# Patient Record
(demographics unavailable — no encounter records)

---

## 2024-10-18 NOTE — HISTORY OF PRESENT ILLNESS
[FreeTextEntry1] : 64 y/o F with h/o DVT / PE in 2022, on lifelong anticoagulation, presents with symptoms of tingling in the hands and feet, was seen by Neurology and EMG testing showed carpal tunnel syndrome and LE testing was told to be normal. She was sent in as she has h/o DVT.

## 2024-10-18 NOTE — ASSESSMENT
[FreeTextEntry1] :  64 y/o F with h/o DVT / PE in 2022, on lifelong anticoagulation, presents with symptoms of tingling in the hands and feet, was seen by Neurology and EMG testing showed carpal tunnel syndrome and LE testing was told to be normal. She was sent in as she has h/o DVT.  Duplex today showed no evidence of DVT in the LE bilaterally.  She has palpable pulses in the LE. No vascular surgical intervention needed.  Follow up as needed.  I spent 35 minutes with patient performing physical exam, reviewing duplex results, discussing treatment plan and follow up.

## 2024-10-18 NOTE — DATA REVIEWED
[FreeTextEntry1] : I performed a venous duplex which was medically necessary to evaluate for DVT. It showed no evidence of DVT in the LE bilaterally.
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
## 2024-12-03 NOTE — ASSESSMENT
[FreeTextEntry1] : Patient is a 65 year old female with H/o Coomb's positive Warm autoimmune hemolytic anemia, responded previously well on Prednisone. Acute B/L peroneal Vein DVT and acute PE currently stable on AC.  # She had recurrent hemolysis. s/p Rituxan x 4 doses now resolved.  Plan: Previous notes reviewed and all relevant laboratory results discussed with  and were communicated to the patient.  -- CBC today is stable. Will continue to monitor. -- She will continue PO Vitamin B12 and Folic Acid.  #DVT/PE : likely provoked by acute hemolysis. Pt's Ct scan in 3/23 did show e/o Rt ventricular strain. ECHO was unremarkable other than mild pulm HTN.  Had a long discussion with pt about duration of AC. Since it seems to have been a provoked event, one could consider DCing AC. I suggested holding AC x 2 weeks and then checking D-Dimer. Based on those rslts could DC or restart AC. Options of low dose Eliquis 2.5mg bid or ASA were d/w pt.  She is concerned about risk of recurrent DVT and wants to continue for now. Will also see her cardio and could have ECHO repeated. -- Patient will continue on Eliquis 5 mg po every 12 hrs,  -- WOF signs of bleeding/bruising.  Will address this at her next visit again. RTC in 4 months with CBC.
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
## 2024-12-13 NOTE — BEGINNING OF VISIT
[0] : 2) Feeling down, depressed, or hopeless: Not at all (0) [PHQ-2 Negative] : PHQ-2 Negative [Pain Scale: ___] : On a scale of 1-10, today the patient's pain is a(n) [unfilled]. [Never] : Never [0-4] : 0-4 [Date Discussed (MM/DD/YY): ___] : Discussed: [unfilled] [Reviewed, no changes] : Reviewed, no changes [[id number]] : 0

## 2024-12-13 NOTE — BEGINNING OF VISIT
[0] : 2) Feeling down, depressed, or hopeless: Not at all (0) [PHQ-2 Negative] : PHQ-2 Negative [Pain Scale: ___] : On a scale of 1-10, today the patient's pain is a(n) [unfilled]. [Never] : Never [0-4] : 0-4 [Date Discussed (MM/DD/YY): ___] : Discussed: [unfilled] [Reviewed, no changes] : Reviewed, no changes [[id number]] : 0

## 2024-12-13 NOTE — BEGINNING OF VISIT
[0] : 2) Feeling down, depressed, or hopeless: Not at all (0) [PHQ-2 Negative] : PHQ-2 Negative [Pain Scale: ___] : On a scale of 1-10, today the patient's pain is a(n) [unfilled]. [Never] : Never [0-4] : 0-4 [Date Discussed (MM/DD/YY): ___] : Discussed: [unfilled] [Reviewed, no changes] : Reviewed, no changes [[id number]] : 0

## 2025-03-14 NOTE — ASSESSMENT
[FreeTextEntry1] : Patient is a 66 year old female with H/o Coomb's positive Warm autoimmune hemolytic anemia, responded previously well on Prednisone. Acute B/L peroneal Vein DVT and acute PE currently stable on AC.  # She had recurrent hemolysis. s/p Rituxan x 4 doses now resolved.  Plan: Previous notes reviewed and all relevant laboratory results discussed with and were communicated to the patient.  -- CBC remains stable. Will continue to monitor. -- She will continue PO Vitamin B12 and Folic Acid.  #DVT/PE : likely provoked by acute hemolysis. Pt's Ct scan in 3/23 did show e/o Rt ventricular strain. ECHO was unremarkable other than mild pulm HTN. PT HAS DISCONTINUED ELIQUIS after speaking to her cardio.  UTD with GI ( had H.Pylori recently) GYN and mammogram  Seeing a specialist at [facility] for low Ig levels ? CVID RTC in 6 months with CBC.
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
## 2025-03-14 NOTE — ASSESSMENT
[FreeTextEntry1] : Patient is a 66 year old female with H/o Coomb's positive Warm autoimmune hemolytic anemia, responded previously well on Prednisone. Acute B/L peroneal Vein DVT and acute PE currently stable on AC.  # She had recurrent hemolysis. s/p Rituxan x 4 doses now resolved.  Plan: Previous notes reviewed and all relevant laboratory results discussed with and were communicated to the patient.  -- CBC remains stable. Will continue to monitor. -- She will continue PO Vitamin B12 and Folic Acid.  #DVT/PE : likely provoked by acute hemolysis. Pt's Ct scan in 3/23 did show e/o Rt ventricular strain. ECHO was unremarkable other than mild pulm HTN. PT HAS DISCONTINUED ELIQUIS after speaking to her cardio.  UTD with GI ( had H.Pylori recently) GYN and mammogram  Seeing a specialist at [facility] for low Ig levels ? CVID RTC in 6 months with CBC.